# Patient Record
Sex: MALE | Race: WHITE | NOT HISPANIC OR LATINO | Employment: FULL TIME | URBAN - METROPOLITAN AREA
[De-identification: names, ages, dates, MRNs, and addresses within clinical notes are randomized per-mention and may not be internally consistent; named-entity substitution may affect disease eponyms.]

---

## 2017-09-18 ENCOUNTER — OFFICE VISIT (OUTPATIENT)
Dept: PEDIATRICS | Facility: CLINIC | Age: 57
End: 2017-09-18
Payer: COMMERCIAL

## 2017-09-18 VITALS
DIASTOLIC BLOOD PRESSURE: 82 MMHG | WEIGHT: 132.4 LBS | SYSTOLIC BLOOD PRESSURE: 128 MMHG | BODY MASS INDEX: 22.06 KG/M2 | TEMPERATURE: 97.6 F | OXYGEN SATURATION: 98 % | HEIGHT: 65 IN | HEART RATE: 85 BPM

## 2017-09-18 DIAGNOSIS — Z13.6 CARDIOVASCULAR SCREENING; LDL GOAL LESS THAN 130: ICD-10-CM

## 2017-09-18 DIAGNOSIS — Z13.1 SCREENING FOR DIABETES MELLITUS (DM): ICD-10-CM

## 2017-09-18 DIAGNOSIS — Z00.00 ROUTINE GENERAL MEDICAL EXAMINATION AT A HEALTH CARE FACILITY: Primary | ICD-10-CM

## 2017-09-18 DIAGNOSIS — Z12.5 PROSTATE CANCER SCREENING: ICD-10-CM

## 2017-09-18 DIAGNOSIS — Z13.0 SCREENING FOR DEFICIENCY ANEMIA: ICD-10-CM

## 2017-09-18 DIAGNOSIS — Z23 NEED FOR HEPATITIS VACCINATION: ICD-10-CM

## 2017-09-18 DIAGNOSIS — Z13.29 SCREENING FOR THYROID DISORDER: ICD-10-CM

## 2017-09-18 DIAGNOSIS — Z11.59 NEED FOR HEPATITIS C SCREENING TEST: ICD-10-CM

## 2017-09-18 DIAGNOSIS — Z20.5 EXPOSURE TO HEPATITIS C: ICD-10-CM

## 2017-09-18 LAB
ALBUMIN SERPL-MCNC: 3.7 G/DL (ref 3.4–5)
ALP SERPL-CCNC: 77 U/L (ref 40–150)
ALT SERPL W P-5'-P-CCNC: 26 U/L (ref 0–70)
ANION GAP SERPL CALCULATED.3IONS-SCNC: 3 MMOL/L (ref 3–14)
AST SERPL W P-5'-P-CCNC: 27 U/L (ref 0–45)
BASOPHILS # BLD AUTO: 0 10E9/L (ref 0–0.2)
BASOPHILS NFR BLD AUTO: 0.7 %
BILIRUB SERPL-MCNC: 0.6 MG/DL (ref 0.2–1.3)
BUN SERPL-MCNC: 16 MG/DL (ref 7–30)
CALCIUM SERPL-MCNC: 8.4 MG/DL (ref 8.5–10.1)
CHLORIDE SERPL-SCNC: 98 MMOL/L (ref 94–109)
CHOLEST SERPL-MCNC: 180 MG/DL
CO2 SERPL-SCNC: 31 MMOL/L (ref 20–32)
CREAT SERPL-MCNC: 1.08 MG/DL (ref 0.66–1.25)
DIFFERENTIAL METHOD BLD: NORMAL
EOSINOPHIL # BLD AUTO: 0.1 10E9/L (ref 0–0.7)
EOSINOPHIL NFR BLD AUTO: 2.4 %
ERYTHROCYTE [DISTWIDTH] IN BLOOD BY AUTOMATED COUNT: 12.2 % (ref 10–15)
GFR SERPL CREATININE-BSD FRML MDRD: 70 ML/MIN/1.7M2
GLUCOSE SERPL-MCNC: 98 MG/DL (ref 70–99)
HCT VFR BLD AUTO: 41.8 % (ref 40–53)
HCV AB SERPL QL IA: NONREACTIVE
HDLC SERPL-MCNC: 60 MG/DL
HGB BLD-MCNC: 14.9 G/DL (ref 13.3–17.7)
LDLC SERPL CALC-MCNC: 106 MG/DL
LYMPHOCYTES # BLD AUTO: 1.4 10E9/L (ref 0.8–5.3)
LYMPHOCYTES NFR BLD AUTO: 25.3 %
MCH RBC QN AUTO: 30.5 PG (ref 26.5–33)
MCHC RBC AUTO-ENTMCNC: 35.6 G/DL (ref 31.5–36.5)
MCV RBC AUTO: 86 FL (ref 78–100)
MONOCYTES # BLD AUTO: 0.6 10E9/L (ref 0–1.3)
MONOCYTES NFR BLD AUTO: 11.2 %
NEUTROPHILS # BLD AUTO: 3.2 10E9/L (ref 1.6–8.3)
NEUTROPHILS NFR BLD AUTO: 60.4 %
NONHDLC SERPL-MCNC: 120 MG/DL
PLATELET # BLD AUTO: 274 10E9/L (ref 150–450)
POTASSIUM SERPL-SCNC: 3.9 MMOL/L (ref 3.4–5.3)
PROT SERPL-MCNC: 7.2 G/DL (ref 6.8–8.8)
PSA SERPL-ACNC: 0.34 UG/L (ref 0–4)
RBC # BLD AUTO: 4.88 10E12/L (ref 4.4–5.9)
SODIUM SERPL-SCNC: 132 MMOL/L (ref 133–144)
TRIGL SERPL-MCNC: 72 MG/DL
TSH SERPL DL<=0.005 MIU/L-ACNC: 3.84 MU/L (ref 0.4–4)
WBC # BLD AUTO: 5.4 10E9/L (ref 4–11)

## 2017-09-18 PROCEDURE — 90471 IMMUNIZATION ADMIN: CPT | Performed by: FAMILY MEDICINE

## 2017-09-18 PROCEDURE — 99396 PREV VISIT EST AGE 40-64: CPT | Mod: 25 | Performed by: FAMILY MEDICINE

## 2017-09-18 PROCEDURE — G0103 PSA SCREENING: HCPCS | Performed by: FAMILY MEDICINE

## 2017-09-18 PROCEDURE — 36415 COLL VENOUS BLD VENIPUNCTURE: CPT | Performed by: FAMILY MEDICINE

## 2017-09-18 PROCEDURE — 86803 HEPATITIS C AB TEST: CPT | Performed by: FAMILY MEDICINE

## 2017-09-18 PROCEDURE — 90636 HEP A/HEP B VACC ADULT IM: CPT | Performed by: FAMILY MEDICINE

## 2017-09-18 PROCEDURE — 80050 GENERAL HEALTH PANEL: CPT | Performed by: FAMILY MEDICINE

## 2017-09-18 PROCEDURE — 80061 LIPID PANEL: CPT | Performed by: FAMILY MEDICINE

## 2017-09-18 NOTE — PROGRESS NOTES
SUBJECTIVE:   CC: Kenneth Brown is an 56 year old male who presents for preventative health visit.     Healthy Habits:    Do you get at least three servings of calcium containing foods daily (dairy, green leafy vegetables, etc.)? yes    Amount of exercise or daily activities, outside of work: 7 day(s) per week    Problems taking medications regularly not applicable    Medication side effects: No    Have you had an eye exam in the past two years? yes    Do you see a dentist twice per year? yes    Do you have sleep apnea, excessive snoring or daytime drowsiness?no              Today's PHQ-2 Score:   PHQ-2 ( 1999 Pfizer) 9/18/2017 6/21/2016   Q1: Little interest or pleasure in doing things 0 0   Q2: Feeling down, depressed or hopeless 0 0   PHQ-2 Score 0 0         Abuse: Current or Past(Physical, Sexual or Emotional)- No  Do you feel safe in your environment - Yes    Social History   Substance Use Topics     Smoking status: Never Smoker     Smokeless tobacco: Never Used     Alcohol use Yes      Comment: occasionally     The patient does not drink >3 drinks per day nor >7 drinks per week.    Last PSA:   PSA   Date Value Ref Range Status   08/01/2013 0.18 0 - 4 ug/L Final       Reviewed orders with patient. Reviewed health maintenance and updated orders accordingly - Yes  Labs reviewed in EPIC  BP Readings from Last 3 Encounters:   09/18/17 140/82   06/21/16 128/80   08/04/14 138/84    Wt Readings from Last 3 Encounters:   09/18/17 132 lb 6.4 oz (60.1 kg)   06/21/16 131 lb (59.4 kg)   08/04/14 133 lb 3.2 oz (60.4 kg)                  Patient Active Problem List   Diagnosis     Exposure to hepatitis C     Contact with and exposure to tuberculosis     Eczema-dishydrotic     CARDIOVASCULAR SCREENING; LDL GOAL LESS THAN 130     Past Surgical History:   Procedure Laterality Date     NO HISTORY OF SURGERY         Social History   Substance Use Topics     Smoking status: Never Smoker     Smokeless tobacco: Never Used      "Alcohol use Yes      Comment: occasionally     Family History   Problem Relation Age of Onset     CEREBROVASCULAR DISEASE Father      HEART DISEASE Paternal Grandfather      Hypertension Maternal Grandfather      CEREBROVASCULAR DISEASE Paternal Grandmother      HEART DISEASE Paternal Grandmother          No current outpatient prescriptions on file.     No Known Allergies  Recent Labs   Lab Test  06/21/16   0905  03/07/12   0827   LDL  93  129   HDL  64  51   TRIG  76  76              Reviewed and updated as needed this visit by clinical staffTobacco  Allergies  Meds  Med Hx  Surg Hx  Fam Hx  Soc Hx        Reviewed and updated as needed this visit by Provider          Past Medical History:   Diagnosis Date     Contact with and exposure to tuberculosis     mother      Exposure to hepatitis C     wife with Hep C      Past Surgical History:   Procedure Laterality Date     NO HISTORY OF SURGERY                ROS:  C: NEGATIVE for fever, chills, change in weight  I: NEGATIVE for worrisome rashes, moles or lesions  E: NEGATIVE for vision changes or irritation  ENT: NEGATIVE for ear, mouth and throat problems  R: NEGATIVE for significant cough or SOB  CV: NEGATIVE for chest pain, palpitations or peripheral edema  GI: NEGATIVE for nausea, abdominal pain, heartburn, or change in bowel habits   male: negative for dysuria, hematuria, decreased urinary stream, erectile dysfunction, urethral discharge  M: NEGATIVE for significant arthralgias or myalgia  N: NEGATIVE for weakness, dizziness or paresthesias  E: NEGATIVE for temperature intolerance, skin/hair changes  H: NEGATIVE for bleeding problems  P: NEGATIVE for changes in mood or affect    OBJECTIVE:   /82  Pulse 85  Temp 97.6  F (36.4  C) (Temporal)  Ht 5' 5\" (1.651 m)  Wt 132 lb 6.4 oz (60.1 kg)  SpO2 98%  BMI 22.03 kg/m2  EXAM:  GENERAL: healthy, alert and no distress  EYES: Eyes grossly normal to inspection, PERRL and conjunctivae and sclerae " normal  HENT: ear canals and TM's normal, nose and mouth without ulcers or lesions  NECK: no adenopathy, no asymmetry, masses, or scars and thyroid normal to palpation  RESP: lungs clear to auscultation - no rales, rhonchi or wheezes  CV: regular rate and rhythm, normal S1 S2, no S3 or S4, no murmur, click or rub, no peripheral edema and peripheral pulses strong  ABDOMEN: soft, nontender, no hepatosplenomegaly, no masses and bowel sounds normal   (male): normal male genitalia without lesions or urethral discharge, no hernia  RECTAL: normal sphincter tone, no rectal masses, prostate normal size, smooth, nontender without nodules or masses  MS: no gross musculoskeletal defects noted, no edema  SKIN: no suspicious lesions or rashes  NEURO: Normal strength and tone, mentation intact and speech normal  PSYCH: mentation appears normal, affect normal/bright    ASSESSMENT/PLAN:   1. Routine general medical examination at a health care facility  : Discussed on regular exercises, healthy eating, self testicular exams  and routine dental checks.  BP Readings from Last 6 Encounters:   09/18/17 128/82   06/21/16 128/80   08/04/14 138/84   07/29/14 102/70   08/01/13 100/76   03/02/12 111/69       - CBC with platelets and differential  - Comprehensive metabolic panel  - TSH with free T4 reflex  - **Hepatitis C Screen Reflex to RNA FUTURE anytime  - Lipid panel reflex to direct LDL  - Prostate spec antigen screen  - HEPA/HEPB VACCINE ADULT IM    2. CARDIOVASCULAR SCREENING; LDL GOAL LESS THAN 130    - Lipid panel reflex to direct LDL    3. Exposure to hepatitis C  From wife  - **Hepatitis C Screen Reflex to RNA FUTURE anytime  - HEPA/HEPB VACCINE ADULT IM    4. Screening for deficiency anemia    - CBC with platelets and differential    5. Screening for diabetes mellitus (DM)    - Comprehensive metabolic panel    6. Screening for thyroid disorder    - TSH with free T4 reflex    7. Need for hepatitis C screening test    -  "**Hepatitis C Screen Reflex to RNA FUTURE anytime    8. Need for hepatitis vaccination    - HEPA/HEPB VACCINE ADULT IM    9. Prostate cancer screening    - Prostate spec antigen screen    COUNSELING:  Reviewed preventive health counseling, as reflected in patient instructions  Special attention given to:        Regular exercise       Healthy diet/nutrition       Immunizations    Vaccinated for: Hepatitis A and Hepatitis B           Consider Hep C screening for patients born between 1945 and 1965       Colon cancer screening       Prostate cancer screening       The 10-year ASCVD risk score (William ALVA Jr, et al., 2013) is: 5.4%    Values used to calculate the score:      Age: 56 years      Sex: Male      Is Non- : No      Diabetic: No      Tobacco smoker: No      Systolic Blood Pressure: 140 mmHg      Is BP treated: No      HDL Cholesterol: 64 mg/dL      Total Cholesterol: 172 mg/dL       Advance Care Planning           reports that he has never smoked. He has never used smokeless tobacco.      Estimated body mass index is 22.03 kg/(m^2) as calculated from the following:    Height as of this encounter: 5' 5\" (1.651 m).    Weight as of this encounter: 132 lb 6.4 oz (60.1 kg).         Counseling Resources:  ATP IV Guidelines  Pooled Cohorts Equation Calculator  FRAX Risk Assessment  ICSI Preventive Guidelines  Dietary Guidelines for Americans, 2010  USDA's MyPlate  ASA Prophylaxis  Lung CA Screening    Ryan Rachel MD  [facility]  "

## 2017-09-18 NOTE — MR AVS SNAPSHOT
After Visit Summary   9/18/2017    Kenneth Brown    MRN: [id number]           Patient Information     Date Of Birth          1960        Visit Information        Provider Department      9/18/2017 8:40 AM Ryan Rachel MD [facility]        Today's Diagnoses     Routine general medical examination at a health care facility    -  1    CARDIOVASCULAR SCREENING; LDL GOAL LESS THAN 130        Exposure to hepatitis C        Screening for deficiency anemia        Screening for diabetes mellitus (DM)        Screening for thyroid disorder        Need for hepatitis C screening test        Need for hepatitis vaccination        Prostate cancer screening          Care Instructions    Get the labs today  Get the vaccinations today      Preventive Health Recommendations  Male Ages 50 - 64    Yearly exam:             See your health care provider every year in order to  o   Review health changes.   o   Discuss preventive care.    o   Review your medicines if your doctor has prescribed any.     Have a cholesterol test every 5 years, or more frequently if you are at risk for high cholesterol/heart disease.     Have a diabetes test (fasting glucose) every three years. If you are at risk for diabetes, you should have this test more often.     Have a colonoscopy at age 50, or have a yearly FIT test (stool test). These exams will check for colon cancer.      Talk with your health care provider about whether or not a prostate cancer screening test (PSA) is right for you.    You should be tested each year for STDs (sexually transmitted diseases), if you re at risk.     Shots: Get a flu shot each year. Get a tetanus shot every 10 years.     Nutrition:    Eat at least 5 servings of fruits and vegetables daily.     Eat whole-grain bread, whole-wheat pasta and brown rice instead of white grains and rice.     Talk to your provider about Calcium and Vitamin D.     Lifestyle    Exercise for at least  150 minutes a week (30 minutes a day, 5 days a week). This will help you control your weight and prevent disease.     Limit alcohol to one drink per day.     No smoking.     Wear sunscreen to prevent skin cancer.     See your dentist every six months for an exam and cleaning.     See your eye doctor every 1 to 2 years.            Follow-ups after your visit        Who to contact     If you have questions or need follow up information about today's clinic visit or your schedule please contact [facility] directly at [phone].  Normal or non-critical lab and imaging results will be communicated to you by [employer], letter or phone within 4 business days after the clinic has received the results. If you do not hear from us within 7 days, please contact the clinic through [employer] or phone. If you have a critical or abnormal lab result, we will notify you by phone as soon as possible.  Submit refill requests through [employer] or call your pharmacy and they will forward the refill request to us. Please allow 3 business days for your refill to be completed.          Additional Information About Your Visit        Approva Information     [employer] gives you secure access to your electronic health record. If you see a primary care provider, you can also send messages to your care team and make appointments. If you have questions, please call your primary care clinic.  If you do not have a primary care provider, please call [phone] and they will assist you.      [employer] is an electronic gateway that provides easy, online access to your medical records. With [employer], you can request a clinic appointment, read your test results, renew a prescription or communicate with your care team.     To access your existing account, please contact your [facility] or call [phone] for assistance.        Care EveryWhere ID     This is your Care EveryWhere ID. This could be used by other  "organizations to access your [facility] medical records  [id number]        Your Vitals Were     Pulse Temperature Height Pulse Oximetry BMI (Body Mass Index)       85 97.6  F (36.4  C) (Temporal) 5' 5\" (1.651 m) 98% 22.03 kg/m2        Blood Pressure from Last 3 Encounters:   09/18/17 128/82   06/21/16 128/80   08/04/14 138/84    Weight from Last 3 Encounters:   09/18/17 132 lb 6.4 oz (60.1 kg)   06/21/16 131 lb (59.4 kg)   08/04/14 133 lb 3.2 oz (60.4 kg)              We Performed the Following     **Hepatitis C Screen Reflex to RNA FUTURE anytime     CBC with platelets and differential     Comprehensive metabolic panel     HEPA/HEPB VACCINE ADULT IM     Lipid panel reflex to direct LDL     Prostate spec antigen screen     TSH with free T4 reflex        Primary Care Provider Office Phone # Fax #    Ryan Rachel [phone]       [address]  [facility] [address]        Equal Access to Services     [facility]: Hadii aad ku hadasho Soomaali, waaxda luqadaha, qaybta kaalmada adeegyada, lani ogden . So [facility] [phone].    ATENCIÓN: Si habla español, tiene a thomas disposición servicios gratuitos de asistencia lingüística. Llame al [phone].    We comply with applicable federal civil rights laws and [place] laws. We do not discriminate on the basis of race, color, national origin, age, disability sex, sexual orientation or gender identity.            Thank you!     Thank you for choosing [facility]  for your care. Our goal is always to provide you with excellent care. Hearing back from our patients is one way we can continue to improve our services. Please take a few minutes to complete the written survey that you may receive in the mail after your visit with us. Thank you!             Your Updated Medication List - Protect others around you: Learn how to safely use, store and throw away your medicines at www.disposemymeds.org.      Notice "  As of 9/18/2017  9:14 AM    You have not been prescribed any medications.

## 2017-09-18 NOTE — PATIENT INSTRUCTIONS
Get the labs today  Get the vaccinations today      Preventive Health Recommendations  Male Ages 50   64    Yearly exam:             See your health care provider every year in order to  o   Review health changes.   o   Discuss preventive care.    o   Review your medicines if your doctor has prescribed any.     Have a cholesterol test every 5 years, or more frequently if you are at risk for high cholesterol/heart disease.     Have a diabetes test (fasting glucose) every three years. If you are at risk for diabetes, you should have this test more often.     Have a colonoscopy at age 50, or have a yearly FIT test (stool test). These exams will check for colon cancer.      Talk with your health care provider about whether or not a prostate cancer screening test (PSA) is right for you.    You should be tested each year for STDs (sexually transmitted diseases), if you re at risk.     Shots: Get a flu shot each year. Get a tetanus shot every 10 years.     Nutrition:    Eat at least 5 servings of fruits and vegetables daily.     Eat whole-grain bread, whole-wheat pasta and brown rice instead of white grains and rice.     Talk to your provider about Calcium and Vitamin D.     Lifestyle    Exercise for at least 150 minutes a week (30 minutes a day, 5 days a week). This will help you control your weight and prevent disease.     Limit alcohol to one drink per day.     No smoking.     Wear sunscreen to prevent skin cancer.     See your dentist every six months for an exam and cleaning.     See your eye doctor every 1 to 2 years.

## 2017-09-18 NOTE — NURSING NOTE
"Chief Complaint   Patient presents with     Physical       Initial /82  Pulse 85  Temp 97.6  F (36.4  C) (Temporal)  Ht 5' 5\" (1.651 m)  Wt 132 lb 6.4 oz (60.1 kg)  SpO2 98%  BMI 22.03 kg/m2 Estimated body mass index is 22.03 kg/(m^2) as calculated from the following:    Height as of this encounter: 5' 5\" (1.651 m).    Weight as of this encounter: 132 lb 6.4 oz (60.1 kg).  Medication Reconciliation: complete     Romana Agosto CMA  "

## 2017-09-19 NOTE — PROGRESS NOTES
Odell Cooper,  Your test results of fasting blood sugar, Liver and kidney functions, hepatitis C screening, thyroid functions, hemoglobin and blood counts,Prostate cancer screening test and fasting cholesterol are all normal and within range.  Your sodium is very slightly low,which could be nonspecific.  Try to eat normal sodium in diet.  Overall test results are good and reassuring.   Let me know if you have any questions. Take care.  Ryan Rachel MD

## 2018-10-01 ENCOUNTER — TELEPHONE (OUTPATIENT)
Dept: PEDIATRICS | Facility: CLINIC | Age: 58
End: 2018-10-01

## 2018-10-01 NOTE — TELEPHONE ENCOUNTER
Patient advised he is due for the 3rd twinrix immunization .  Offered to schedule ancillary appt Patient states he will call back to schedule an ancillary visit.    Romana Agosto CMA

## 2019-01-25 ENCOUNTER — OFFICE VISIT (OUTPATIENT)
Dept: PEDIATRICS | Facility: CLINIC | Age: 59
End: 2019-01-25
Payer: COMMERCIAL

## 2019-01-25 VITALS
BODY MASS INDEX: 22.24 KG/M2 | HEIGHT: 65 IN | OXYGEN SATURATION: 98 % | HEART RATE: 87 BPM | DIASTOLIC BLOOD PRESSURE: 78 MMHG | TEMPERATURE: 98.2 F | WEIGHT: 133.5 LBS | SYSTOLIC BLOOD PRESSURE: 128 MMHG

## 2019-01-25 DIAGNOSIS — Z80.42 FAMILY HX OF PROSTATE CANCER: ICD-10-CM

## 2019-01-25 DIAGNOSIS — Z13.220 SCREENING FOR LIPID DISORDERS: ICD-10-CM

## 2019-01-25 DIAGNOSIS — Z00.00 ANNUAL PHYSICAL EXAM: Primary | ICD-10-CM

## 2019-01-25 LAB
ALBUMIN SERPL-MCNC: 3.8 G/DL (ref 3.4–5)
ALP SERPL-CCNC: 74 U/L (ref 40–150)
ALT SERPL W P-5'-P-CCNC: 27 U/L (ref 0–70)
ANION GAP SERPL CALCULATED.3IONS-SCNC: 4 MMOL/L (ref 3–14)
AST SERPL W P-5'-P-CCNC: 20 U/L (ref 0–45)
BILIRUB SERPL-MCNC: 0.5 MG/DL (ref 0.2–1.3)
BUN SERPL-MCNC: 9 MG/DL (ref 7–30)
CALCIUM SERPL-MCNC: 8.8 MG/DL (ref 8.5–10.1)
CHLORIDE SERPL-SCNC: 98 MMOL/L (ref 94–109)
CHOLEST SERPL-MCNC: 175 MG/DL
CO2 SERPL-SCNC: 30 MMOL/L (ref 20–32)
CREAT SERPL-MCNC: 0.88 MG/DL (ref 0.66–1.25)
GFR SERPL CREATININE-BSD FRML MDRD: >90 ML/MIN/{1.73_M2}
GLUCOSE SERPL-MCNC: 93 MG/DL (ref 70–99)
HDLC SERPL-MCNC: 60 MG/DL
LDLC SERPL CALC-MCNC: 105 MG/DL
NONHDLC SERPL-MCNC: 115 MG/DL
POTASSIUM SERPL-SCNC: 3.9 MMOL/L (ref 3.4–5.3)
PROT SERPL-MCNC: 7.2 G/DL (ref 6.8–8.8)
PSA SERPL-ACNC: 0.23 UG/L (ref 0–4)
SODIUM SERPL-SCNC: 132 MMOL/L (ref 133–144)
TRIGL SERPL-MCNC: 51 MG/DL

## 2019-01-25 PROCEDURE — 99396 PREV VISIT EST AGE 40-64: CPT | Performed by: INTERNAL MEDICINE

## 2019-01-25 PROCEDURE — 80061 LIPID PANEL: CPT | Performed by: INTERNAL MEDICINE

## 2019-01-25 PROCEDURE — 80053 COMPREHEN METABOLIC PANEL: CPT | Performed by: INTERNAL MEDICINE

## 2019-01-25 PROCEDURE — G0103 PSA SCREENING: HCPCS | Performed by: INTERNAL MEDICINE

## 2019-01-25 PROCEDURE — 36415 COLL VENOUS BLD VENIPUNCTURE: CPT | Performed by: INTERNAL MEDICINE

## 2019-01-25 ASSESSMENT — MIFFLIN-ST. JEOR: SCORE: 1343.46

## 2019-01-25 NOTE — PROGRESS NOTES
SUBJECTIVE:   CC: Kenneth Brown is an 58 year old male who presents for preventive health visit.     HPI  58-year-old gentleman comes for annual preventive physical examination.  He gives history of a brother recently been diagnosed with prostate cancer at age 65 and having undergone radical prostatectomy.  He is interested in having his PSA checked.  He is interested in having lipid screening test done as well.  He offers no other current concerns.  Denies change in bowel habits.  No urogenital symptoms.  No chest pain or shortness of breath.  No joint pain or joint swelling.      Healthy Habits:    Do you get at least three servings of calcium containing foods daily (dairy, green leafy vegetables, etc.)? yes    Amount of exercise or daily activities, outside of work: None    Problems taking medications regularly not applicable    Medication side effects: No    Have you had an eye exam in the past two years? yes    Do you see a dentist twice per year? yes    Do you have sleep apnea, excessive snoring or daytime drowsiness?no          Today's PHQ-2 Score:   PHQ-2 ( 1999 Pfizer) 1/25/2019 9/18/2017   Q1: Little interest or pleasure in doing things 0 0   Q2: Feeling down, depressed or hopeless 0 0   PHQ-2 Score 0 0       Abuse: Current or Past(Physical, Sexual or Emotional)- No  Do you feel safe in your environment? Yes    Social History     Tobacco Use     Smoking status: Never Smoker     Smokeless tobacco: Never Used   Substance Use Topics     Alcohol use: Yes     Alcohol/week: 3.6 oz     Types: 6 Cans of beer per week     Comment: occasionally     If you drink alcohol do you typically have >3 drinks per day or >7 drinks per week? No                      Last PSA:   PSA   Date Value Ref Range Status   09/18/2017 0.34 0 - 4 ug/L Final     Comment:     Assay Method:  Chemiluminescence using Siemens Vista analyzer       Reviewed orders with patient. Reviewed health maintenance and updated orders accordingly -  Yes  Patient Active Problem List   Diagnosis     Exposure to hepatitis C     Contact with and exposure to tuberculosis     Eczema-dishydrotic     CARDIOVASCULAR SCREENING; LDL GOAL LESS THAN 130     Past Surgical History:   Procedure Laterality Date     NO HISTORY OF SURGERY         Social History     Tobacco Use     Smoking status: Never Smoker     Smokeless tobacco: Never Used   Substance Use Topics     Alcohol use: Yes     Alcohol/week: 3.6 oz     Types: 6 Cans of beer per week     Comment: occasionally     Family History   Problem Relation Age of Onset     Cerebrovascular Disease Father 44     Heart Disease Paternal Grandfather      Hypertension Maternal Grandfather      Cerebrovascular Disease Paternal Grandmother      Heart Disease Paternal Grandmother      Prostate Cancer Brother          No current outpatient medications on file.     No Known Allergies    Reviewed and updated as needed this visit by clinical staff  Tobacco  Allergies  Meds  Med Hx  Surg Hx  Fam Hx  Soc Hx        Reviewed and updated as needed this visit by Provider  Tobacco  Soc Hx       Past Medical History:   Diagnosis Date     Contact with and exposure to tuberculosis     mother      Exposure to hepatitis C     wife with Hep C      Past Surgical History:   Procedure Laterality Date     NO HISTORY OF SURGERY         ROS:  CONSTITUTIONAL: NEGATIVE for fever, chills, change in weight  INTEGUMENTARY/SKIN: NEGATIVE for worrisome rashes, moles or lesions  EYES: NEGATIVE for vision changes or irritation  ENT: NEGATIVE for ear, mouth and throat problems  RESP: NEGATIVE for significant cough or SOB  CV: NEGATIVE for chest pain, palpitations or peripheral edema  GI: NEGATIVE for nausea, abdominal pain, heartburn, or change in bowel habits   male: negative for dysuria, hematuria, decreased urinary stream, erectile dysfunction, urethral discharge  MUSCULOSKELETAL: NEGATIVE for significant arthralgias or myalgia  NEURO: NEGATIVE for weakness,  "dizziness or paresthesias  ENDOCRINE: NEGATIVE for temperature intolerance, skin/hair changes  HEME/ALLERGY/IMMUNE: NEGATIVE for bleeding problems  PSYCHIATRIC: NEGATIVE for changes in mood or affect    OBJECTIVE:   /83 (BP Location: Right arm, Patient Position: Sitting, Cuff Size: Adult Regular)   Pulse 87   Temp 98.2  F (36.8  C) (Temporal)   Ht 1.645 m (5' 4.75\")   Wt 60.6 kg (133 lb 8 oz)   SpO2 98%   BMI 22.39 kg/m    EXAM:  GENERAL: healthy, alert and no distress  EYES: Eyes grossly normal to inspection, PERRL and conjunctivae and sclerae normal  HENT: ear canals and TM's normal, nose and mouth without ulcers or lesions  NECK: no adenopathy, no asymmetry, masses, or scars and thyroid normal to palpation  RESP: lungs clear to auscultation - no rales, rhonchi or wheezes  CV: regular rate and rhythm, normal S1 S2, no S3 or S4, no murmur, click or rub, no peripheral edema and peripheral pulses strong  ABDOMEN: soft, nontender, no hepatosplenomegaly, no masses and bowel sounds normal   (male): normal male genitalia without lesions or urethral discharge, no hernia  RECTAL: normal sphincter tone, no rectal masses, prostate normal size, smooth, nontender without nodules or masses  MS: no gross musculoskeletal defects noted, no edema  SKIN: no suspicious lesions or rashes  NEURO: Normal strength and tone, mentation intact and speech normal  PSYCH: mentation appears normal, affect normal/bright  LYMPH: no cervical, supraclavicular, axillary, or inguinal adenopathy    Diagnostic Test Results:  none     ASSESSMENT/PLAN:     1.  Annual preventive physical examination is good.  2.  Family history of prostate cancer with brother having cancer at age 85.    We will check CMP lipids and PSA.  I will get back to the results.  The pros and cons of screening PSA were discussed with him.  Limitation of screening PSA discussed.      COUNSELING:  Reviewed preventive health counseling, as reflected in patient " "instructions       Regular exercise       Healthy diet/nutrition    BP Readings from Last 1 Encounters:   01/25/19 133/83     Estimated body mass index is 22.39 kg/m  as calculated from the following:    Height as of this encounter: 1.645 m (5' 4.75\").    Weight as of this encounter: 60.6 kg (133 lb 8 oz).           reports that  has never smoked. he has never used smokeless tobacco.      Counseling Resources:  ATP IV Guidelines  Pooled Cohorts Equation Calculator  FRAX Risk Assessment  ICSI Preventive Guidelines  Dietary Guidelines for Americans, 2010  USDA's MyPlate  ASA Prophylaxis  Lung CA Screening    Marvin Morillo MD  [facility]  "

## 2020-02-05 ENCOUNTER — OFFICE VISIT (OUTPATIENT)
Dept: PEDIATRICS | Facility: CLINIC | Age: 60
End: 2020-02-05
Payer: COMMERCIAL

## 2020-02-05 VITALS
TEMPERATURE: 98.2 F | DIASTOLIC BLOOD PRESSURE: 74 MMHG | WEIGHT: 132.7 LBS | SYSTOLIC BLOOD PRESSURE: 130 MMHG | HEART RATE: 81 BPM | OXYGEN SATURATION: 98 % | HEIGHT: 65 IN | BODY MASS INDEX: 22.11 KG/M2

## 2020-02-05 DIAGNOSIS — Z12.11 COLON CANCER SCREENING: ICD-10-CM

## 2020-02-05 DIAGNOSIS — Z28.21 IMMUNIZATION NOT CARRIED OUT BECAUSE OF PATIENT REFUSAL: ICD-10-CM

## 2020-02-05 DIAGNOSIS — Z00.00 ROUTINE GENERAL MEDICAL EXAMINATION AT A HEALTH CARE FACILITY: Primary | ICD-10-CM

## 2020-02-05 DIAGNOSIS — Z13.6 CARDIOVASCULAR SCREENING; LDL GOAL LESS THAN 130: ICD-10-CM

## 2020-02-05 DIAGNOSIS — Z13.1 SCREENING FOR DIABETES MELLITUS (DM): ICD-10-CM

## 2020-02-05 DIAGNOSIS — Z13.29 SCREENING FOR THYROID DISORDER: ICD-10-CM

## 2020-02-05 DIAGNOSIS — L30.9 DERMATITIS: ICD-10-CM

## 2020-02-05 DIAGNOSIS — Z12.5 PROSTATE CANCER SCREENING: ICD-10-CM

## 2020-02-05 DIAGNOSIS — Z11.4 SCREENING FOR HUMAN IMMUNODEFICIENCY VIRUS: ICD-10-CM

## 2020-02-05 DIAGNOSIS — Z80.42 FAMILY HISTORY OF PROSTATE CANCER: ICD-10-CM

## 2020-02-05 DIAGNOSIS — E87.1 HYPONATREMIA: ICD-10-CM

## 2020-02-05 LAB
ANION GAP SERPL CALCULATED.3IONS-SCNC: 2 MMOL/L (ref 3–14)
BUN SERPL-MCNC: 13 MG/DL (ref 7–30)
CALCIUM SERPL-MCNC: 9.1 MG/DL (ref 8.5–10.1)
CHLORIDE SERPL-SCNC: 95 MMOL/L (ref 94–109)
CHOLEST SERPL-MCNC: 190 MG/DL
CO2 SERPL-SCNC: 31 MMOL/L (ref 20–32)
CREAT SERPL-MCNC: 0.9 MG/DL (ref 0.66–1.25)
GFR SERPL CREATININE-BSD FRML MDRD: >90 ML/MIN/{1.73_M2}
GLUCOSE SERPL-MCNC: 90 MG/DL (ref 70–99)
HDLC SERPL-MCNC: 71 MG/DL
LDLC SERPL CALC-MCNC: 107 MG/DL
NONHDLC SERPL-MCNC: 119 MG/DL
POTASSIUM SERPL-SCNC: 4.3 MMOL/L (ref 3.4–5.3)
PSA SERPL-ACNC: 0.28 UG/L (ref 0–4)
SODIUM SERPL-SCNC: 128 MMOL/L (ref 133–144)
TRIGL SERPL-MCNC: 61 MG/DL
TSH SERPL DL<=0.005 MIU/L-ACNC: 2.86 MU/L (ref 0.4–4)

## 2020-02-05 PROCEDURE — 87389 HIV-1 AG W/HIV-1&-2 AB AG IA: CPT | Performed by: FAMILY MEDICINE

## 2020-02-05 PROCEDURE — 80048 BASIC METABOLIC PNL TOTAL CA: CPT | Performed by: FAMILY MEDICINE

## 2020-02-05 PROCEDURE — 99396 PREV VISIT EST AGE 40-64: CPT | Performed by: FAMILY MEDICINE

## 2020-02-05 PROCEDURE — 36415 COLL VENOUS BLD VENIPUNCTURE: CPT | Performed by: FAMILY MEDICINE

## 2020-02-05 PROCEDURE — 80061 LIPID PANEL: CPT | Performed by: FAMILY MEDICINE

## 2020-02-05 PROCEDURE — G0103 PSA SCREENING: HCPCS | Performed by: FAMILY MEDICINE

## 2020-02-05 PROCEDURE — 84443 ASSAY THYROID STIM HORMONE: CPT | Performed by: FAMILY MEDICINE

## 2020-02-05 ASSESSMENT — PAIN SCALES - GENERAL: PAINLEVEL: NO PAIN (0)

## 2020-02-05 ASSESSMENT — MIFFLIN-ST. JEOR: SCORE: 1334.83
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
## 2020-02-05 NOTE — LETTER
April 14, 2020      Kenneth Brown  [address] [place] [address]            Dear Kenneth Brown:    This is to remind you that your provider wanted you to return to the clinic for lab test.    If you are coming in for Lipids and/or Glucose testing please fast for 10-12 hours. Morning medications can be taken with water.    You may call our office to schedule an appointment.    Please disregard this notice if you have already had your labs drawn or made an            appointment.    Sincerely,        Ryan Rachel MD

## 2020-02-05 NOTE — PATIENT INSTRUCTIONS
Get the labs today  Schedule for colonoscopy  Call to schedule for skin consult       Preventive Health Recommendations  Male Ages 50 - 64    Yearly exam:             See your health care provider every year in order to  o   Review health changes.   o   Discuss preventive care.    o   Review your medicines if your doctor has prescribed any.     Have a cholesterol test every 5 years, or more frequently if you are at risk for high cholesterol/heart disease.     Have a diabetes test (fasting glucose) every three years. If you are at risk for diabetes, you should have this test more often.     Have a colonoscopy at age 50, or have a yearly FIT test (stool test). These exams will check for colon cancer.      Talk with your health care provider about whether or not a prostate cancer screening test (PSA) is right for you.    You should be tested each year for STDs (sexually transmitted diseases), if you re at risk.     Shots: Get a flu shot each year. Get a tetanus shot every 10 years.     Nutrition:    Eat at least 5 servings of fruits and vegetables daily.     Eat whole-grain bread, whole-wheat pasta and brown rice instead of white grains and rice.     Get adequate Calcium and Vitamin D.     Lifestyle    Exercise for at least 150 minutes a week (30 minutes a day, 5 days a week). This will help you control your weight and prevent disease.     Limit alcohol to one drink per day.     No smoking.     Wear sunscreen to prevent skin cancer.     See your dentist every six months for an exam and cleaning.     See your eye doctor every 1 to 2 years.

## 2020-02-05 NOTE — LETTER
July 20, 2020      Kenneth Brown  [address] [place] [address]            Dear Kenneth Brown:    This is to remind you that your provider wanted you to return to the clinic for lab test.    If you are coming in for Lipids and/or Glucose testing please fast for 10-12 hours. Morning medications can be taken with water.    You may call our office to schedule an appointment.    Please disregard this notice if you have already had your labs drawn or made an            appointment.    Sincerely,        Ryan Rachel MD

## 2020-02-05 NOTE — RESULT ENCOUNTER NOTE
Dear Kenneth,  Your lab test indicated normal results for PSA-prostate cancer screening test, this is good.  Your fasting cholesterol numbers are within expected range for your age, this is reassuring.  Your electrolytes showed low sodium, which is slightly worse from last year.  This is concerning and needs further evaluation.  Please call to schedule for a follow-up in 2 weeks.  We will repeat the labs along with other specific labs to evaluate this low sodium.   Let me know if you have any questions. Take care.  Ryan Rachel MD

## 2020-02-06 LAB — HIV 1+2 AB+HIV1 P24 AG SERPL QL IA: NONREACTIVE

## 2020-02-07 NOTE — RESULT ENCOUNTER NOTE
Kenneth,  Your test results for HIV screening is negative, this is good and reassuring.   Let me know if you have any questions. Take care.  Ryan Rachel MD

## 2020-12-06 ENCOUNTER — HEALTH MAINTENANCE LETTER (OUTPATIENT)
Age: 60
End: 2020-12-06

## 2021-04-11 ENCOUNTER — HEALTH MAINTENANCE LETTER (OUTPATIENT)
Age: 61
End: 2021-04-11

## 2021-09-26 ENCOUNTER — HEALTH MAINTENANCE LETTER (OUTPATIENT)
Age: 61
End: 2021-09-26

## 2022-05-07 ENCOUNTER — HEALTH MAINTENANCE LETTER (OUTPATIENT)
Age: 62
End: 2022-05-07

## 2023-04-23 ENCOUNTER — HEALTH MAINTENANCE LETTER (OUTPATIENT)
Age: 63
End: 2023-04-23

## 2023-06-02 ENCOUNTER — HEALTH MAINTENANCE LETTER (OUTPATIENT)
Age: 63
End: 2023-06-02

## 2025-01-27 ENCOUNTER — OFFICE VISIT (OUTPATIENT)
Dept: FAMILY MEDICINE | Facility: CLINIC | Age: 65
End: 2025-01-27
Payer: COMMERCIAL

## 2025-01-27 VITALS
HEIGHT: 65 IN | DIASTOLIC BLOOD PRESSURE: 85 MMHG | WEIGHT: 133.3 LBS | SYSTOLIC BLOOD PRESSURE: 142 MMHG | RESPIRATION RATE: 14 BRPM | HEART RATE: 89 BPM | TEMPERATURE: 97.3 F | BODY MASS INDEX: 22.21 KG/M2 | OXYGEN SATURATION: 97 %

## 2025-01-27 DIAGNOSIS — E87.1 HYPONATREMIA: ICD-10-CM

## 2025-01-27 DIAGNOSIS — Z00.00 ROUTINE GENERAL MEDICAL EXAMINATION AT A HEALTH CARE FACILITY: Primary | ICD-10-CM

## 2025-01-27 DIAGNOSIS — Z12.5 SPECIAL SCREENING FOR MALIGNANT NEOPLASM OF PROSTATE: ICD-10-CM

## 2025-01-27 DIAGNOSIS — Z12.11 SPECIAL SCREENING FOR MALIGNANT NEOPLASMS, COLON: ICD-10-CM

## 2025-01-27 DIAGNOSIS — R03.0 ELEVATED BLOOD PRESSURE READING WITHOUT DIAGNOSIS OF HYPERTENSION: ICD-10-CM

## 2025-01-27 LAB
ALBUMIN SERPL BCG-MCNC: 4.3 G/DL (ref 3.5–5.2)
ALP SERPL-CCNC: 86 U/L (ref 40–150)
ALT SERPL W P-5'-P-CCNC: 30 U/L (ref 0–70)
ANION GAP SERPL CALCULATED.3IONS-SCNC: 9 MMOL/L (ref 7–15)
AST SERPL W P-5'-P-CCNC: 35 U/L (ref 0–45)
BASOPHILS # BLD AUTO: 0 10E3/UL (ref 0–0.2)
BASOPHILS NFR BLD AUTO: 1 %
BILIRUB SERPL-MCNC: 0.5 MG/DL
BUN SERPL-MCNC: 10.7 MG/DL (ref 8–23)
CALCIUM SERPL-MCNC: 9.9 MG/DL (ref 8.8–10.4)
CHLORIDE SERPL-SCNC: 93 MMOL/L (ref 98–107)
CHOLEST SERPL-MCNC: 211 MG/DL
CREAT SERPL-MCNC: 0.9 MG/DL (ref 0.67–1.17)
EGFRCR SERPLBLD CKD-EPI 2021: >90 ML/MIN/1.73M2
EOSINOPHIL # BLD AUTO: 0.2 10E3/UL (ref 0–0.7)
EOSINOPHIL NFR BLD AUTO: 4 %
ERYTHROCYTE [DISTWIDTH] IN BLOOD BY AUTOMATED COUNT: 11.8 % (ref 10–15)
FASTING STATUS PATIENT QL REPORTED: YES
FASTING STATUS PATIENT QL REPORTED: YES
GLUCOSE SERPL-MCNC: 103 MG/DL (ref 70–99)
HCO3 SERPL-SCNC: 28 MMOL/L (ref 22–29)
HCT VFR BLD AUTO: 44.1 % (ref 40–53)
HDLC SERPL-MCNC: 64 MG/DL
HGB BLD-MCNC: 15.4 G/DL (ref 13.3–17.7)
IMM GRANULOCYTES # BLD: 0 10E3/UL
IMM GRANULOCYTES NFR BLD: 0 %
LDLC SERPL CALC-MCNC: 129 MG/DL
LYMPHOCYTES # BLD AUTO: 1.4 10E3/UL (ref 0.8–5.3)
LYMPHOCYTES NFR BLD AUTO: 23 %
MCH RBC QN AUTO: 29.8 PG (ref 26.5–33)
MCHC RBC AUTO-ENTMCNC: 34.9 G/DL (ref 31.5–36.5)
MCV RBC AUTO: 85 FL (ref 78–100)
MONOCYTES # BLD AUTO: 0.8 10E3/UL (ref 0–1.3)
MONOCYTES NFR BLD AUTO: 13 %
NEUTROPHILS # BLD AUTO: 3.7 10E3/UL (ref 1.6–8.3)
NEUTROPHILS NFR BLD AUTO: 60 %
NONHDLC SERPL-MCNC: 147 MG/DL
PLATELET # BLD AUTO: 315 10E3/UL (ref 150–450)
POTASSIUM SERPL-SCNC: 4.4 MMOL/L (ref 3.4–5.3)
PROT SERPL-MCNC: 7.3 G/DL (ref 6.4–8.3)
PSA SERPL DL<=0.01 NG/ML-MCNC: 0.16 NG/ML (ref 0–4.5)
RBC # BLD AUTO: 5.17 10E6/UL (ref 4.4–5.9)
SODIUM SERPL-SCNC: 130 MMOL/L (ref 135–145)
T4 FREE SERPL-MCNC: 1.4 NG/DL (ref 0.9–1.7)
TRIGL SERPL-MCNC: 88 MG/DL
TSH SERPL DL<=0.005 MIU/L-ACNC: 4.28 UIU/ML (ref 0.3–4.2)
WBC # BLD AUTO: 6.1 10E3/UL (ref 4–11)

## 2025-01-27 PROCEDURE — 85025 COMPLETE CBC W/AUTO DIFF WBC: CPT | Performed by: PHYSICIAN ASSISTANT

## 2025-01-27 PROCEDURE — 36415 COLL VENOUS BLD VENIPUNCTURE: CPT | Performed by: PHYSICIAN ASSISTANT

## 2025-01-27 PROCEDURE — 84443 ASSAY THYROID STIM HORMONE: CPT | Performed by: PHYSICIAN ASSISTANT

## 2025-01-27 PROCEDURE — 80053 COMPREHEN METABOLIC PANEL: CPT | Performed by: PHYSICIAN ASSISTANT

## 2025-01-27 PROCEDURE — 99214 OFFICE O/P EST MOD 30 MIN: CPT | Mod: 25 | Performed by: PHYSICIAN ASSISTANT

## 2025-01-27 PROCEDURE — G0103 PSA SCREENING: HCPCS | Performed by: PHYSICIAN ASSISTANT

## 2025-01-27 PROCEDURE — G2211 COMPLEX E/M VISIT ADD ON: HCPCS | Performed by: PHYSICIAN ASSISTANT

## 2025-01-27 PROCEDURE — 80061 LIPID PANEL: CPT | Performed by: PHYSICIAN ASSISTANT

## 2025-01-27 PROCEDURE — 99387 INIT PM E/M NEW PAT 65+ YRS: CPT | Performed by: PHYSICIAN ASSISTANT

## 2025-01-27 PROCEDURE — 84439 ASSAY OF FREE THYROXINE: CPT | Performed by: PHYSICIAN ASSISTANT

## 2025-01-27 ASSESSMENT — PAIN SCALES - GENERAL: PAINLEVEL_OUTOF10: NO PAIN (0)

## 2025-01-27 NOTE — PROGRESS NOTES
Preventive Care Visit  [facility] KINZA Garrido PA-C, Family Medicine  Jan 27, 2025      Assessment & Plan     Routine general medical examination at a health care facility  Reviewed VS, weight/BMI   Routine screenings see orders  Immunizations: see orders and documentation in HPI. Discussed vaccines coverage as pharmacy benefit.  Health and cancer screening recommendations delivered according to the USPSTF and other appropriate society guidelines  Nutrition and exercise counseling performed.  Declines vaccines  Declines colon cancer screening   - CBC with Platelets & Differential; Future  - Comprehensive metabolic panel; Future  - Lipid panel reflex to direct LDL Fasting; Future  - TSH with free T4 reflex; Future  - CBC with Platelets & Differential  - Comprehensive metabolic panel  - Lipid panel reflex to direct LDL Fasting  - TSH with free T4 reflex    Hyponatremia  Reviewed with him hyponatremia trend on past labs in [facility]. Most recent was from labs from 5 yr ago- sodium was 128, and he never followed up with .   He notes this is a long standing issue and he feels well/fine. He declines any other labs today (serum osmo, urine sodium/osmo) aside from checking the sodium as part of the comp panel.   He is not sure how much water he drinks in a day. He thinks maybe up to a gallon but isn't sure. Discussed restricting plain water, adding in beverages with electrolytes.   If persisting to be low, discussed additional testing and referral to nephrology. Discussed risks and s/sx of severe hyponatremia. He states understanding.    - Comprehensive metabolic panel; Future  - Comprehensive metabolic panel    Elevated blood pressure reading without diagnosis of hypertension  Repeat still elevated systolic.   Discussed factors that influence blood pressure.   Encouraged him to get a cuff to check at home and monitor pattern. If >140/>90 then would advise follow up to start a  "medication    Special screening for malignant neoplasm of prostate  He is agreeable to screening.   - Prostate Specific Antigen Screen; Future  - Prostate Specific Antigen Screen    Special screening for malignant neoplasms, colon  He declines all methods of screening             Follow Up: see above. Additionally patient was instructed to contact clinic for worsening symptoms, non-improvement in time frame discussed, and for questions regarding treatment plan.   For virtual visits, the patient was advised to be seen for in person evaluation if symptoms or condition are worsening or non-improvement as expected.   Selma Garrido PA-C    Tanner Cooper is a 64 year old, presenting for the following:    - pt declined to answer all questions     Physical        1/27/2025     9:16 AM   Additional Questions   Roomed by AG   Accompanied by self          HPI  Routine Health Maintenance: last physical was 2020  Immunizations: declines all   Colonoscopy: 2010- normal repeat in 10 year. I am not doing any more of those. Declines cologuard or FIT.   PSA screening: last normal in 2020 (5 yr ago).   Fasting: yes    He would like fasting labs today. Last lipids 5 yr ago.   \"Typical american diet\". Restaurant food once a week when we get tired of cooking at home.   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   Exercise- lift weights. Ellipitical Mon-Saturday. Run in nice weather- 1 mile.     Hyponatremia- on prior labs. Last labs 02/2020 (5 yr ago)- sodim 128 (L). Never followed up for further eval.   He thinks he may average a gallon of water a day but he isn't sure.   Coffee daily. Tea occasionally. Few beers on the weekends (14-15)- not during the week.   I am fine, I feel good. This has been going on a long time. I don't need other evaluation.   Not on any medications      Health Care Directive  Patient does not have a Health Care Directive: Discussed advance care planning with patient; " information given to patient to review.       No data to display                   No data to display                   No data to display                      No data to display                   No data to display                   No data to display                     No data to display                       Today's PHQ-2 Score:       2/5/2020    12:16 PM   PHQ-2 ( 1999 [employer])   Q1: Little interest or pleasure in doing things 0   Q2: Feeling down, depressed or hopeless 0   PHQ-2 Score 0   PHQ-2 Total Score (12-17 Years)- Positive if 3 or more points; Administer PHQ-A if positive 0          No data to display              Social History     Tobacco Use    Smoking status: Never     Passive exposure: Never    Smokeless tobacco: Never   Substance Use Topics    Alcohol use: Yes     Alcohol/week: 6.0 standard drinks of alcohol     Types: 6 Cans of beer per week     Comment: occasionally    Drug use: No       Last PSA:   PSA   Date Value Ref Range Status   02/05/2020 0.28 0 - 4 ug/L Final     Comment:     Assay Method:  Chemiluminescence using Siemens Vista analyzer     ASCVD Risk   The ASCVD Risk score (Meena SAM, et al., 2019) failed to calculate for the following reasons:    The BP treatment status is invalid            Reviewed and updated as needed this visit by Provider                    Past Medical History:   Diagnosis Date    Contact with and exposure to tuberculosis     mother     Exposure to hepatitis C     wife with Hep C     Past Surgical History:   Procedure Laterality Date    NO HISTORY OF SURGERY       Lab work is in process  Labs reviewed in EPIC  BP Readings from Last 3 Encounters:   01/27/25 (!) 142/85   02/05/20 130/74   01/25/19 128/78    Wt Readings from Last 3 Encounters:   01/27/25 60.5 kg (133 lb 4.8 oz)   02/05/20 60.2 kg (132 lb 11.2 oz)   01/25/19 60.6 kg (133 lb 8 oz)                  Patient Active Problem List   Diagnosis    Exposure to hepatitis C    Contact with and exposure  "to tuberculosis    Eczema-dishydrotic    CARDIOVASCULAR SCREENING; LDL GOAL LESS THAN 130    Family history of prostate cancer in brother    Immunization not carried out because of patient refusal     Past Surgical History:   Procedure Laterality Date    NO HISTORY OF SURGERY         Social History     Tobacco Use    Smoking status: Never     Passive exposure: Never    Smokeless tobacco: Never   Substance Use Topics    Alcohol use: Yes     Alcohol/week: 15.0 standard drinks of alcohol     Types: 15 Cans of beer per week     Comment: occasionally     Family History   Problem Relation Age of Onset    Cerebrovascular Disease Father 44    Heart Disease Paternal Grandfather     Hypertension Maternal Grandfather     Cerebrovascular Disease Paternal Grandmother     Heart Disease Paternal Grandmother     Prostate Cancer Brother          No current outpatient medications on file.     No Known Allergies  Current providers sharing in care for this patient include:  Patient Care Team:  Ryan Rachel MD as PCP - General    The following health maintenance items are reviewed in Epic and correct as of today:  There are no preventive care reminders to display for this patient.      Review of Systems  Constitutional, HEENT, cardiovascular, pulmonary, gi and gu systems are negative, except as otherwise noted.     Objective    Exam  BP (!) 142/85   Pulse 89   Temp 97.3  F (36.3  C) (Temporal)   Resp 14   Ht 1.64 m (5' 4.57\")   Wt 60.5 kg (133 lb 4.8 oz)   SpO2 97%   BMI 22.48 kg/m     Estimated body mass index is 22.48 kg/m  as calculated from the following:    Height as of this encounter: 1.64 m (5' 4.57\").    Weight as of this encounter: 60.5 kg (133 lb 4.8 oz).    Physical Exam  GENERAL: alert and no distress  EYES: Eyes grossly normal to inspection, PERRL and conjunctivae and sclerae normal  HENT: ear canals and TM's normal, nose and mouth without ulcers or lesions  NECK: no adenopathy, no asymmetry, masses, or " scars  RESP: lungs clear to auscultation - no rales, rhonchi or wheezes  CV: regular rate and rhythm, normal S1 S2, no S3 or S4, no murmur, click or rub, no peripheral edema  ABDOMEN: soft, nontender, no hepatosplenomegaly, no masses and bowel sounds normal  MS: no gross musculoskeletal defects noted, no edema  NEURO: Normal strength and tone, mentation intact and speech normal  PSYCH: mentation appears normal, affect normal/bright        1/27/2025   Mini Cog   Mini-Cog Not Completed (choose reason) Patient declines       Patient declines, there are NO concerns for cognitive deficits.      Results for orders placed or performed in visit on 01/27/25   CBC with platelets and differential     Status: None   Result Value Ref Range    WBC Count 6.1 4.0 - 11.0 10e3/uL    RBC Count 5.17 4.40 - 5.90 10e6/uL    Hemoglobin 15.4 13.3 - 17.7 g/dL    Hematocrit 44.1 40.0 - 53.0 %    MCV 85 78 - 100 fL    MCH 29.8 26.5 - 33.0 pg    MCHC 34.9 31.5 - 36.5 g/dL    RDW 11.8 10.0 - 15.0 %    Platelet Count 315 150 - 450 10e3/uL    % Neutrophils 60 %    % Lymphocytes 23 %    % Monocytes 13 %    % Eosinophils 4 %    % Basophils 1 %    % Immature Granulocytes 0 %    Absolute Neutrophils 3.7 1.6 - 8.3 10e3/uL    Absolute Lymphocytes 1.4 0.8 - 5.3 10e3/uL    Absolute Monocytes 0.8 0.0 - 1.3 10e3/uL    Absolute Eosinophils 0.2 0.0 - 0.7 10e3/uL    Absolute Basophils 0.0 0.0 - 0.2 10e3/uL    Absolute Immature Granulocytes 0.0 <=0.4 10e3/uL   CBC with Platelets & Differential     Status: None    Narrative    The following orders were created for panel order CBC with Platelets & Differential.  Procedure                               Abnormality         Status                     ---------                               -----------         ------                     CBC with platelets and d...[[id number]]                      Final result                 Please view results for these tests on the individual orders.         Signed Electronically  by: Selma Garrido PA-C

## 2025-01-27 NOTE — PATIENT INSTRUCTIONS
"Patient Education     Instructions from Today's Visit:  Get a blood pressure machine from the pharmacy  Check at home a few days per week.   Normal is less than 140 / less than 90.   If you are consistently running above either 140 or 90, then would want to treat with a medication.     Would recommend evaluation for the low sodium.   Cut back on the \"plain water\".   Add in electrolyte containing beverages  instead- gatorade, liquid IV packets dissolved into 20 oz water, etc.   If still low or lower than last year, I would advise a referral to the kidney specialist.     General Instructions After Your Visit  If you have been seen for a concern and are worsening or not improving as expected, please schedule a follow-up visit or reach out to a member of our care team or nurses if it is urgent.  We have Nurse advice available 24/7 by calling [phone].  For emergencies, please call [phone].    My Clinic Hours  I am in the office Mondays, Wednesdays, and Thursdays. Messages received outside of normal clinic hours and on my days out of the office will be reviewed by our [place] care team, but may not be addressed by me personally until I am back in the office. If you have concerns that cannot wait for my return please call the Nurse advise line [phone].    Test results  You may see your lab or test results before we can make recommendations. This is common, as sometimes we are awaiting on other labs to return or we are out of office on a particular day. Please be patient, and if you don't see a response from me or one of my colleagues within 2-3 business days, and you have a specific concern, please send us a message.    Refills  If you have run out of refills, please schedule a visit. This is generally an indication that you are due for a follow-up visit. All prescriptions are only valid for 1 year from the date written and need a visit annually to renew. Most mental health and chronic diseases we are treating " (diabetes, high blood pressure, etc) require some type of visit every 6 months. We are now offering video visits! However, if physical exams are needed or it is a complex concern, we may ask you to be seen in person.    Physicals & Preventative Visits   These appointment slots fill up fast. Please consider scheduling these 2-3 months in advance to allow for the appointment time that fits you best. If you have medications ordered or other issues addressed that are not preventive at these visits, please be aware there are extra costs associated with this.       Preventive Care Advice   This is general advice given by our system to help you stay healthy. However, your care team may have specific advice just for you. Please talk to your care team about your preventive care needs.  Nutrition  Eat 5 or more servings of fruits and vegetables each day.  Try wheat bread, brown rice and whole grain pasta (instead of white bread, rice, and pasta).  Get enough calcium and vitamin D. Check the label on foods and aim for 100% of the RDA (recommended daily allowance).  Lifestyle  Exercise at least 150 minutes each week  (30 minutes a day, 5 days a week).  Do muscle strengthening activities 2 days a week. These help control your weight and prevent disease.  No smoking.  Wear sunscreen to prevent skin cancer.  Have a dental exam and cleaning every 6 months.  Yearly exams  See your health care team every year to talk about:  Any changes in your health.  Any medicines your care team has prescribed.  Preventive care, family planning, and ways to prevent chronic diseases.  Shots (vaccines)   HPV shots (up to age 26), if you've never had them before.  Hepatitis B shots (up to age 59), if you've never had them before.  COVID-19 shot: Get this shot when it's due.  Flu shot: Get a flu shot every year.  Tetanus shot: Get a tetanus shot every 10 years.  Pneumococcal, hepatitis A, and RSV shots: Ask your care team if you need these based on  your risk.  Shingles shot (for age 50 and up)  General health tests  Diabetes screening:  Starting at age 35, Get screened for diabetes at least every 3 years.  If you are younger than age 35, ask your care team if you should be screened for diabetes.  Cholesterol test: At age 39, start having a cholesterol test every 5 years, or more often if advised.  Bone density scan (DEXA): At age 50, ask your care team if you should have this scan for osteoporosis (brittle bones).  Hepatitis C: Get tested at least once in your life.  STIs (sexually transmitted infections)  Before age 24: Ask your care team if you should be screened for STIs.  After age 24: Get screened for STIs if you're at risk. You are at risk for STIs (including HIV) if:  You are sexually active with more than one person.  You don't use condoms every time.  You or a partner was diagnosed with a sexually transmitted infection.  If you are at risk for HIV, ask about PrEP medicine to prevent HIV.  Get tested for HIV at least once in your life, whether you are at risk for HIV or not.  Cancer screening tests  Cervical cancer screening: If you have a cervix, begin getting regular cervical cancer screening tests starting at age 21.  Breast cancer scan (mammogram): If you've ever had breasts, begin having regular mammograms starting at age 40. This is a scan to check for breast cancer.  Colon cancer screening: It is important to start screening for colon cancer at age 45.  Have a colonoscopy test every 10 years (or more often if you're at risk) Or, ask your provider about stool tests like a FIT test every year or Cologuard test every 3 years.  To learn more about your testing options, visit:   .  For help making a decision, visit:   https://bit.ly/uc27394.  Prostate cancer screening test: If you have a prostate, ask your care team if a prostate cancer screening test (PSA) at age 55 is right for you.  Lung cancer screening: If you are a current or former smoker ages  50 to 80, ask your care team if ongoing lung cancer screenings are right for you.  For informational purposes only. Not to replace the advice of your health care provider. Copyright   2023 [employer]. All rights reserved. Clinically reviewed by the [facility] Transitions Program. [employer] [id number] - REV 01/24.